# Patient Record
Sex: MALE | Race: WHITE
[De-identification: names, ages, dates, MRNs, and addresses within clinical notes are randomized per-mention and may not be internally consistent; named-entity substitution may affect disease eponyms.]

---

## 2020-03-10 ENCOUNTER — APPOINTMENT (OUTPATIENT)
Dept: ORTHOPEDIC SURGERY | Facility: CLINIC | Age: 30
End: 2020-03-10
Payer: COMMERCIAL

## 2020-03-10 DIAGNOSIS — M25.373 OTHER INSTABILITY, UNSPECIFIED ANKLE: ICD-10-CM

## 2020-03-10 DIAGNOSIS — M76.829 POSTERIOR TIBIAL TENDINITIS, UNSPECIFIED LEG: ICD-10-CM

## 2020-03-10 PROBLEM — Z00.00 ENCOUNTER FOR PREVENTIVE HEALTH EXAMINATION: Status: ACTIVE | Noted: 2020-03-10

## 2020-03-10 PROCEDURE — 73630 X-RAY EXAM OF FOOT: CPT | Mod: LT

## 2020-03-10 PROCEDURE — 73610 X-RAY EXAM OF ANKLE: CPT | Mod: LT

## 2020-03-10 PROCEDURE — 99204 OFFICE O/P NEW MOD 45 MIN: CPT

## 2020-03-10 NOTE — PHYSICAL EXAM
[de-identified] : Left ankle:\par \par Constitutional: \par The patient is healthy-appearing and in no apparent distress. \par \par Gait and Station: \par The patient ambulates with a normal gait and no limp. \par \par Cardiovascular System: \par Ther capillary refill is less than 2 seconds. \par \par Skin: \par There are no skin abnormalities of ankle.\par \par Ankles and Feet: \par Inspection: \par There is no erythema, induration, warmth.\par There is a "too many toes" sign with heel valgus and pes planus.  \par There is no swelling. \par \par Bony Palpation: \par There is no tenderness of the calcaneal tuberosity, the metatarsals, the tarsometatarsal joints, the navicular tuberosity, the dome of talus, the head of talus, or the inferior tibiofibular joint.\par \par Soft Tissue Palpation: \par There is no tenderness of the tibialis posterior, the tibialis anterior, the plantar fascia, the Achilles tendon, the extensor hallucis longus, or the sinus tarsi. \par There is no tenderness of the peroneus longus and brevis.\par There is no tenderness of the deltoid ligament.   \par There is no tenderness of the anterior talofibular ligament and the calcaneofibular ligament. \par \par Active Range of Motion: \par The range of motion at the ankle is full. \par \par Stability: \par The anterior drawer is 3+. \par \par Strength: \par There is 5/5 ankle plantarflexion and dorsiflexion.\par \par Neurological System: \par There is normal sensation to light touch at the ankle and foot. \par \par Psychiatric: \par The patient demonstrates a normal mood and affect and is active and alert. [de-identified] : X-ray left ankle and foot.  There is no significant bony / soft tissue abnormality, arthritis, or fracture.\par

## 2020-03-10 NOTE — ASSESSMENT
[FreeTextEntry1] : Discussed at length with patient exam history and imaging.  Reviewed with patient underlying foot malalignment as well as ankle malalignment and ankle impingement posterior tibial tendinitis and underlying ankle instability.  Recommendations for arch support as well as physical therapy and if no improvement consideration to MRI imaging and possible need for foot realignment surgery an ankle stabilization procedure\par

## 2020-03-10 NOTE — HISTORY OF PRESENT ILLNESS
[de-identified] : Patient reports that the LEFT ankle/foot have been bothering him for about 6 weeks now. He is training for the marathon. Stabbing pain. Pain mostly after running/exercise. He reports weakness in the ankle. Pain located on inner ankle and top of foot. He reports he does wear a brace while running to stabilize the ankle. He has previously gone to PT for the ankle. Clicking in the ankle.